# Patient Record
Sex: FEMALE | Race: BLACK OR AFRICAN AMERICAN | NOT HISPANIC OR LATINO | URBAN - METROPOLITAN AREA
[De-identification: names, ages, dates, MRNs, and addresses within clinical notes are randomized per-mention and may not be internally consistent; named-entity substitution may affect disease eponyms.]

---

## 2022-08-26 ENCOUNTER — HOSPITAL ENCOUNTER (EMERGENCY)
Facility: HOSPITAL | Age: 57
Discharge: HOME | End: 2022-08-26
Attending: EMERGENCY MEDICINE
Payer: COMMERCIAL

## 2022-08-26 ENCOUNTER — APPOINTMENT (EMERGENCY)
Dept: CT IMAGING | Facility: HOSPITAL | Age: 57
End: 2022-08-26
Payer: COMMERCIAL

## 2022-08-26 VITALS
HEIGHT: 65 IN | DIASTOLIC BLOOD PRESSURE: 94 MMHG | SYSTOLIC BLOOD PRESSURE: 177 MMHG | HEART RATE: 72 BPM | OXYGEN SATURATION: 100 % | WEIGHT: 160 LBS | RESPIRATION RATE: 16 BRPM | TEMPERATURE: 98.6 F | BODY MASS INDEX: 26.66 KG/M2

## 2022-08-26 DIAGNOSIS — R03.0 ELEVATED BLOOD PRESSURE READING: Primary | ICD-10-CM

## 2022-08-26 LAB
ALBUMIN SERPL BCP-MCNC: 3.9 G/DL (ref 3.5–5)
ALP SERPL-CCNC: 60 U/L (ref 46–116)
ALT SERPL W P-5'-P-CCNC: 26 U/L (ref 12–78)
ANION GAP SERPL CALCULATED.3IONS-SCNC: 9 MMOL/L (ref 4–13)
APTT PPP: 24 SECONDS (ref 23–37)
AST SERPL W P-5'-P-CCNC: 19 U/L (ref 5–45)
ATRIAL RATE: 72 BPM
BASOPHILS # BLD AUTO: 0.04 THOUSANDS/ΜL (ref 0–0.1)
BASOPHILS NFR BLD AUTO: 1 % (ref 0–1)
BILIRUB SERPL-MCNC: 0.31 MG/DL (ref 0.2–1)
BUN SERPL-MCNC: 8 MG/DL (ref 5–25)
CALCIUM SERPL-MCNC: 9.2 MG/DL (ref 8.3–10.1)
CARDIAC TROPONIN I PNL SERPL HS: <2 NG/L
CHLORIDE SERPL-SCNC: 105 MMOL/L (ref 96–108)
CO2 SERPL-SCNC: 28 MMOL/L (ref 21–32)
CREAT SERPL-MCNC: 0.84 MG/DL (ref 0.6–1.3)
EOSINOPHIL # BLD AUTO: 0.09 THOUSAND/ΜL (ref 0–0.61)
EOSINOPHIL NFR BLD AUTO: 1 % (ref 0–6)
ERYTHROCYTE [DISTWIDTH] IN BLOOD BY AUTOMATED COUNT: 13.2 % (ref 11.6–15.1)
GFR SERPL CREATININE-BSD FRML MDRD: 78 ML/MIN/1.73SQ M
GLUCOSE SERPL-MCNC: 106 MG/DL (ref 65–140)
HCT VFR BLD AUTO: 39.7 % (ref 34.8–46.1)
HGB BLD-MCNC: 13.4 G/DL (ref 11.5–15.4)
IMM GRANULOCYTES # BLD AUTO: 0.02 THOUSAND/UL (ref 0–0.2)
IMM GRANULOCYTES NFR BLD AUTO: 0 % (ref 0–2)
INR PPP: 1.07 (ref 0.84–1.19)
LYMPHOCYTES # BLD AUTO: 2.22 THOUSANDS/ΜL (ref 0.6–4.47)
LYMPHOCYTES NFR BLD AUTO: 36 % (ref 14–44)
MCH RBC QN AUTO: 27.7 PG (ref 26.8–34.3)
MCHC RBC AUTO-ENTMCNC: 33.8 G/DL (ref 31.4–37.4)
MCV RBC AUTO: 82 FL (ref 82–98)
MONOCYTES # BLD AUTO: 0.41 THOUSAND/ΜL (ref 0.17–1.22)
MONOCYTES NFR BLD AUTO: 7 % (ref 4–12)
NEUTROPHILS # BLD AUTO: 3.48 THOUSANDS/ΜL (ref 1.85–7.62)
NEUTS SEG NFR BLD AUTO: 55 % (ref 43–75)
NRBC BLD AUTO-RTO: 0 /100 WBCS
P AXIS: 44 DEGREES
PLATELET # BLD AUTO: 277 THOUSANDS/UL (ref 149–390)
PMV BLD AUTO: 9.7 FL (ref 8.9–12.7)
POTASSIUM SERPL-SCNC: 3.2 MMOL/L (ref 3.5–5.3)
PR INTERVAL: 152 MS
PROT SERPL-MCNC: 8.8 G/DL (ref 6.4–8.4)
PROTHROMBIN TIME: 13.7 SECONDS (ref 11.6–14.5)
QRS AXIS: 28 DEGREES
QRSD INTERVAL: 82 MS
QT INTERVAL: 416 MS
QTC INTERVAL: 455 MS
RBC # BLD AUTO: 4.83 MILLION/UL (ref 3.81–5.12)
SODIUM SERPL-SCNC: 142 MMOL/L (ref 135–147)
T WAVE AXIS: 82 DEGREES
VENTRICULAR RATE: 72 BPM
WBC # BLD AUTO: 6.26 THOUSAND/UL (ref 4.31–10.16)

## 2022-08-26 PROCEDURE — 80053 COMPREHEN METABOLIC PANEL: CPT | Performed by: SURGERY

## 2022-08-26 PROCEDURE — 70450 CT HEAD/BRAIN W/O DYE: CPT

## 2022-08-26 PROCEDURE — 99284 EMERGENCY DEPT VISIT MOD MDM: CPT

## 2022-08-26 PROCEDURE — 93005 ELECTROCARDIOGRAM TRACING: CPT

## 2022-08-26 PROCEDURE — 85025 COMPLETE CBC W/AUTO DIFF WBC: CPT | Performed by: SURGERY

## 2022-08-26 PROCEDURE — 99284 EMERGENCY DEPT VISIT MOD MDM: CPT | Performed by: SURGERY

## 2022-08-26 PROCEDURE — 36415 COLL VENOUS BLD VENIPUNCTURE: CPT | Performed by: SURGERY

## 2022-08-26 PROCEDURE — 85610 PROTHROMBIN TIME: CPT | Performed by: SURGERY

## 2022-08-26 PROCEDURE — 93010 ELECTROCARDIOGRAM REPORT: CPT | Performed by: INTERNAL MEDICINE

## 2022-08-26 PROCEDURE — 85730 THROMBOPLASTIN TIME PARTIAL: CPT | Performed by: SURGERY

## 2022-08-26 PROCEDURE — 84484 ASSAY OF TROPONIN QUANT: CPT | Performed by: SURGERY

## 2022-08-26 NOTE — ED PROVIDER NOTES
History  Chief Complaint   Patient presents with    Hypertension     Pt states she took BP at home and was 158/102, pt has been taking friends nifedipine for the past week until she can get seen by her PCP  Pt took nifedipine at 9pm and 11pm  Pt denies HA, chest pain or shortness of breath  Pt admits to being under a lot of stress recently  Mildred Campbell is a 56 y o  female with no pertinent past medical history presenting today with blood pressure reading at home  Patient has associated headache  Patient reports that for the past 1 week she has been taking her friend nifedipine for her elevated blood pressure  Reports elevated blood pressure reading print her a lot of anxiety  Denies any chest pain shortness of breath  Reports that she has been under lot of stress recently  Her blood pressure at home was 158/102  Denies any syncopal episodes, nausea/vomiting/diarrhea, numbness/tingling/weakness in the extremities  Patient has not followed up with her primary care provider regarding elevated blood pressure reading get, however she has a scheduled appointment  No further complaints at this time  None       History reviewed  No pertinent past medical history  History reviewed  No pertinent surgical history  History reviewed  No pertinent family history  I have reviewed and agree with the history as documented  E-Cigarette/Vaping    E-Cigarette Use Never User      E-Cigarette/Vaping Substances     Social History     Tobacco Use    Smoking status: Never Smoker    Smokeless tobacco: Never Used   Vaping Use    Vaping Use: Never used   Substance Use Topics    Alcohol use: Not Currently    Drug use: Not Currently       Review of Systems   Constitutional: Negative for chills and fever  HENT: Negative for ear pain and sore throat  Eyes: Negative for pain and visual disturbance  Respiratory: Negative for cough and shortness of breath      Cardiovascular: Negative for chest pain and palpitations  Gastrointestinal: Negative for abdominal pain and vomiting  Genitourinary: Negative for dysuria and hematuria  Musculoskeletal: Negative for arthralgias and back pain  Skin: Negative for color change and rash  Neurological: Negative for seizures and syncope  All other systems reviewed and are negative  Physical Exam  Physical Exam  Vitals and nursing note reviewed  Constitutional:       General: She is not in acute distress  Appearance: She is well-developed and normal weight  HENT:      Head: Normocephalic and atraumatic  Right Ear: External ear normal       Left Ear: External ear normal       Nose: Nose normal  No congestion or rhinorrhea  Mouth/Throat:      Mouth: Mucous membranes are moist       Pharynx: Oropharynx is clear  Eyes:      Extraocular Movements: Extraocular movements intact  Conjunctiva/sclera: Conjunctivae normal       Pupils: Pupils are equal, round, and reactive to light  Cardiovascular:      Rate and Rhythm: Normal rate and regular rhythm  Heart sounds: No murmur heard  Pulmonary:      Effort: Pulmonary effort is normal  No respiratory distress  Breath sounds: Normal breath sounds  Abdominal:      Palpations: Abdomen is soft  Tenderness: There is no abdominal tenderness  Musculoskeletal:         General: No swelling, tenderness or deformity  Normal range of motion  Cervical back: Normal range of motion and neck supple  No rigidity  Skin:     General: Skin is warm and dry  Capillary Refill: Capillary refill takes less than 2 seconds  Neurological:      General: No focal deficit present  Mental Status: She is alert and oriented to person, place, and time  Mental status is at baseline  Cranial Nerves: No cranial nerve deficit  Motor: No weakness        Gait: Gait normal          Vital Signs  ED Triage Vitals   Temperature Pulse Respirations Blood Pressure SpO2   08/26/22 0151 08/26/22 0148 08/26/22 0148 08/26/22 0148 08/26/22 0148   98 6 °F (37 °C) 78 18 (!) 186/115 100 %      Temp Source Heart Rate Source Patient Position - Orthostatic VS BP Location FiO2 (%)   08/26/22 0151 08/26/22 0148 08/26/22 0148 08/26/22 0148 --   Oral Monitor Sitting Right arm       Pain Score       --                  Vitals:    08/26/22 0148 08/26/22 0410   BP: (!) 186/115 (!) 177/94   Pulse: 78 72   Patient Position - Orthostatic VS: Sitting Sitting         Visual Acuity      ED Medications  Medications - No data to display    Diagnostic Studies  Results Reviewed     Procedure Component Value Units Date/Time    HS Troponin 0hr (reflex protocol) [[id number]]  (Normal) Collected: 08/26/22 0239    Lab Status: Final result Specimen: Blood from Arm, Left Updated: 08/26/22 0311     hs TnI 0hr <2 ng/L     Comprehensive metabolic panel [[id number]]  (Abnormal) Collected: 08/26/22 0239    Lab Status: Final result Specimen: Blood from Arm, Left Updated: 08/26/22 0306     Sodium 142 mmol/L      Potassium 3 2 mmol/L      Chloride 105 mmol/L      CO2 28 mmol/L      ANION GAP 9 mmol/L      BUN 8 mg/dL      Creatinine 0 84 mg/dL      Glucose 106 mg/dL      Calcium 9 2 mg/dL      AST 19 U/L      ALT 26 U/L      Alkaline Phosphatase 60 U/L      Total Protein 8 8 g/dL      Albumin 3 9 g/dL      Total Bilirubin 0 31 mg/dL      eGFR 78 ml/min/1 73sq m     Narrative:      [place] guidelines for Chronic Kidney Disease (CKD):     Stage 1 with normal or high GFR (GFR > 90 mL/min/1 73 square meters)    Stage 2 Mild CKD (GFR = 60-89 mL/min/1 73 square meters)    Stage 3A Moderate CKD (GFR = 45-59 mL/min/1 73 square meters)    Stage 3B Moderate CKD (GFR = 30-44 mL/min/1 73 square meters)    Stage 4 Severe CKD (GFR = 15-29 mL/min/1 73 square meters)    Stage 5 End Stage CKD (GFR <15 mL/min/1 73 square meters)  Note: GFR calculation is accurate only with a steady state creatinine    Protime-INR [[id number]]  (Normal) Collected: 08/26/22 0239    Lab Status: Final result Specimen: Blood from Arm, Left Updated: 08/26/22 0258     Protime 13 7 seconds      INR 1 07    APTT [[id number]]  (Normal) Collected: 08/26/22 0239    Lab Status: Final result Specimen: Blood from Arm, Left Updated: 08/26/22 0258     PTT 24 seconds     CBC and differential [[id number]] Collected: 08/26/22 0239    Lab Status: Final result Specimen: Blood from Arm, Left Updated: 08/26/22 0243     WBC 6 26 Thousand/uL      RBC 4 83 Million/uL      Hemoglobin 13 4 g/dL      Hematocrit 39 7 %      MCV 82 fL      MCH 27 7 pg      MCHC 33 8 g/dL      RDW 13 2 %      MPV 9 7 fL      Platelets 186 Thousands/uL      nRBC 0 /100 WBCs      Neutrophils Relative 55 %      Immat GRANS % 0 %      Lymphocytes Relative 36 %      Monocytes Relative 7 %      Eosinophils Relative 1 %      Basophils Relative 1 %      Neutrophils Absolute 3 48 Thousands/µL      Immature Grans Absolute 0 02 Thousand/uL      Lymphocytes Absolute 2 22 Thousands/µL      Monocytes Absolute 0 41 Thousand/µL      Eosinophils Absolute 0 09 Thousand/µL      Basophils Absolute 0 04 Thousands/µL                  CT head without contrast   Final Result by Sis Damico MD (08/26 0308)      No acute intracranial abnormality  Workstation performed: [id number]                    Procedures  Procedures         ED Course             HEART Risk Score    Flowsheet Row Most Recent Value   Heart Score Risk Calculator    History 1 Filed at: 08/26/2022 0336   ECG 1 Filed at: 08/26/2022 0336   Age 1 Filed at: 08/26/2022 0336   Risk Factors 1 Filed at: 08/26/2022 0336   Troponin 0 Filed at: 08/26/2022 0336   HEART Score 4 Filed at: 08/26/2022 5199                                      MDM  Number of Diagnoses or Management Options  Elevated blood pressure reading: minor  Diagnosis management comments: Patient lab work unremarkable  Hypertensive urgency without image  Discussed patient bedside    Her blood pressure came down spontaneously resting here in the emergency  Headache resolved  CT imaging was negative  I discussed all the findings with her at bedside  Strict return criteria were discussed with her bedside  I recommended very close follow-up with her primary care provider  She demonstrated understanding  Amount and/or Complexity of Data Reviewed  Clinical lab tests: ordered and reviewed  Tests in the radiology section of CPT®: ordered and reviewed  Tests in the medicine section of CPT®: ordered and reviewed  Review and summarize past medical records: yes  Discuss the patient with other providers: yes  Independent visualization of images, tracings, or specimens: yes    Risk of Complications, Morbidity, and/or Mortality  Presenting problems: moderate  Diagnostic procedures: moderate  Management options: moderate    Patient Progress  Patient progress: stable      Disposition  Final diagnoses:   Elevated blood pressure reading     Time reflects when diagnosis was documented in both MDM as applicable and the Disposition within this note     Time User Action Codes Description Comment    8/26/2022  3:36 AM Lexie León Add [R03 0] Elevated blood pressure reading       ED Disposition     ED Disposition   Discharge    Condition   Stable    Date/Time   Fri Aug 26, 2022  3:36 AM    Comment   Citlalli Blackwood discharge to home/self care  Follow-up Information     Follow up With Specialties Details Why Contact Info Additional [address] [facility] Emergency Department Emergency Medicine Go to  If symptoms worsen [address] [facility] [address] [facility] Emergency Department, [address], [place], 1000 [facility] Cardiology Call today To schedule an appointment for follow-up within the next 1-2 weeks for reevaluation   [address] [facility] [address]  [place] [address] Cardiology [address] [facility], [address],  [address]  Call today To schedule an appointment for follow-up with a primary care provider within the next 1 week  [phone]             There are no discharge medications for this patient  No discharge procedures on file      PDMP Review     None          ED Provider  Electronically Signed by           Cinthia Braxton PA-C  08/26/22 9306

## 2022-09-20 ENCOUNTER — TELEPHONE (OUTPATIENT)
Dept: CARDIOLOGY CLINIC | Facility: CLINIC | Age: 57
End: 2022-09-20

## 2022-09-20 NOTE — TELEPHONE ENCOUNTER
SPOKE TO PT'S INSUR CO & WE ARE IN PAR W/ PT'S INSUR & NO REFERRAL IS NEEDED  REF # OF THE CALL IS: [id number]
None

## 2022-09-22 ENCOUNTER — OFFICE VISIT (OUTPATIENT)
Dept: FAMILY MEDICINE CLINIC | Facility: CLINIC | Age: 57
End: 2022-09-22
Payer: COMMERCIAL

## 2022-09-22 VITALS
TEMPERATURE: 96.4 F | HEIGHT: 65 IN | BODY MASS INDEX: 27.32 KG/M2 | WEIGHT: 164 LBS | OXYGEN SATURATION: 100 % | DIASTOLIC BLOOD PRESSURE: 122 MMHG | SYSTOLIC BLOOD PRESSURE: 182 MMHG | HEART RATE: 78 BPM

## 2022-09-22 DIAGNOSIS — R03.0 ELEVATED BP WITHOUT DIAGNOSIS OF HYPERTENSION: ICD-10-CM

## 2022-09-22 DIAGNOSIS — Z13.220 SCREENING, LIPID: ICD-10-CM

## 2022-09-22 DIAGNOSIS — Z00.00 HEALTHCARE MAINTENANCE: Primary | ICD-10-CM

## 2022-09-22 PROBLEM — Z78.9 VEGETARIAN DIET: Status: ACTIVE | Noted: 2022-09-22

## 2022-09-22 PROCEDURE — 99386 PREV VISIT NEW AGE 40-64: CPT | Performed by: FAMILY MEDICINE

## 2022-09-22 RX ORDER — NIFEDIPINE 30 MG/1
30 TABLET, FILM COATED, EXTENDED RELEASE ORAL DAILY
COMMUNITY
End: 2022-09-23

## 2022-09-22 NOTE — PROGRESS NOTES
Mildred Campbell 1965 female MRN: [id number]      ASSESSMENT/PLAN  Problem List Items Addressed This Visit    None     Visit Diagnoses     Healthcare maintenance    -  Primary    Elevated BP without diagnosis of hypertension        Screening, lipid        Relevant Orders    Lipid panel        BP elevated -- pt was previously taking Nifedipine, encouraged to re-start and f/u with Cardio as scheduled tomorrow   Pt defers therapy referral/medication for stress/anxiety -- is writing, exercising to help with her symptom management     Lipids to screen for HLD  HIV, Hep C screening deferred per pt request   Pap deferred  Mammogram deferred  CRC deferred  Vaccinations: TDap, Flu, Shingles, COVID all deferred  Encouraged regular physical activity, varied diet, and regular dental/eye exams       Future Appointments   Date Time Provider Sha Jayla   9/23/2022  4:00 PM Leobardo Barclay MD [facility]          SUBJECTIVE  CC: Establish Care and Blood Pressure Check      HPI:  Mildred Campbell is a 56 y o  female who presents to establish care  History reviewed and updated as below  ERIK Goodwin on 8/26 due to high blood pressure   CT Head benign   Labs benign, aside from K slightly low   EKG: NSR, non-specific ST changes     Pt was previously on Nifedipine, stopped due to hypotension   Scheduled with Cardiology tomorrow     Pt notes she is under a great deal of stress -- she lost her brother last month and she is going through a divorce     Review of Systems   Constitutional: Negative for unexpected weight change  HENT: Positive for sinus pressure (intermittent)  Negative for congestion, ear pain, rhinorrhea and sore throat  Eyes: Positive for visual disturbance ("my vision is going" -- wears glasses)  Respiratory: Positive for chest tightness  Negative for cough and shortness of breath  Cardiovascular: Negative for chest pain, palpitations and leg swelling     Gastrointestinal: Negative for abdominal pain, constipation and diarrhea  Endocrine: Negative for polyuria  Genitourinary: Negative for dysuria and vaginal bleeding  Neurological: Negative for dizziness and headaches  Psychiatric/Behavioral: Positive for sleep disturbance  The patient is nervous/anxious  Historical Information   The patient history was reviewed and updated as follows:    History reviewed  No pertinent past medical history  Past Surgical History:   Procedure Laterality Date    NO PAST SURGERIES       Family History   Problem Relation Age of Onset    Diabetes Family     Hypertension Family       Social History   Social History     Substance and Sexual Activity   Alcohol Use Yes    Comment: Occasional     Social History     Substance and Sexual Activity   Drug Use Not Currently     Social History     Tobacco Use   Smoking Status Never Smoker   Smokeless Tobacco Never Used       Medications:     Current Outpatient Medications:     NIFEdipine ER (ADALAT CC) 30 MG 24 hr tablet, Take 30 mg by mouth daily, Disp: , Rfl:   No Known Allergies    OBJECTIVE    Vitals:   Vitals:    09/22/22 1102 09/22/22 1116   BP: (!) 196/128 (!) 182/122   Pulse: 78    Temp: (!) 96 4 °F (35 8 °C)    SpO2: 100%    Weight: 74 4 kg (164 lb)    Height: 5' 5" (1 651 m)            Physical Exam  Vitals and nursing note reviewed  Constitutional:       General: She is not in acute distress  Appearance: Normal appearance  HENT:      Head: Normocephalic and atraumatic  Right Ear: Tympanic membrane, ear canal and external ear normal       Left Ear: Tympanic membrane, ear canal and external ear normal       Nose: Nose normal       Mouth/Throat:      Mouth: Mucous membranes are moist       Pharynx: No oropharyngeal exudate or posterior oropharyngeal erythema  Eyes:      Conjunctiva/sclera: Conjunctivae normal    Cardiovascular:      Rate and Rhythm: Normal rate and regular rhythm     Pulmonary:      Effort: Pulmonary effort is normal  No respiratory distress  Breath sounds: Normal breath sounds  Abdominal:      General: Bowel sounds are normal  There is no distension  Palpations: Abdomen is soft  Tenderness: There is no abdominal tenderness  Musculoskeletal:      Right lower leg: No edema  Left lower leg: No edema  Lymphadenopathy:      Cervical: No cervical adenopathy  Skin:     General: Skin is warm and dry  Neurological:      General: No focal deficit present  Mental Status: She is alert     Psychiatric:         Mood and Affect: Mood normal                     DO Norah Sales [address] [facility]   9/22/2022  11:27 AM

## 2022-09-23 ENCOUNTER — CONSULT (OUTPATIENT)
Dept: CARDIOLOGY CLINIC | Facility: CLINIC | Age: 57
End: 2022-09-23
Payer: COMMERCIAL

## 2022-09-23 VITALS
HEART RATE: 76 BPM | OXYGEN SATURATION: 99 % | WEIGHT: 162 LBS | SYSTOLIC BLOOD PRESSURE: 140 MMHG | BODY MASS INDEX: 26.99 KG/M2 | RESPIRATION RATE: 16 BRPM | HEIGHT: 65 IN | DIASTOLIC BLOOD PRESSURE: 98 MMHG

## 2022-09-23 DIAGNOSIS — I10 PRIMARY HYPERTENSION: Primary | ICD-10-CM

## 2022-09-23 LAB
CHOLEST SERPL-MCNC: 199 MG/DL
CHOLEST/HDLC SERPL: 3.4 (CALC)
HDLC SERPL-MCNC: 59 MG/DL
LDLC SERPL CALC-MCNC: 121 MG/DL (CALC)
NONHDLC SERPL-MCNC: 140 MG/DL (CALC)
TRIGL SERPL-MCNC: 88 MG/DL

## 2022-09-23 PROCEDURE — 99204 OFFICE O/P NEW MOD 45 MIN: CPT | Performed by: INTERNAL MEDICINE

## 2022-09-23 RX ORDER — SPIRONOLACTONE 25 MG/1
25 TABLET ORAL DAILY
Qty: 30 TABLET | Refills: 5 | Status: SHIPPED | OUTPATIENT
Start: 2022-09-23

## 2022-09-23 RX ORDER — AMLODIPINE BESYLATE 5 MG/1
5 TABLET ORAL DAILY
Qty: 30 TABLET | Refills: 5 | Status: SHIPPED | OUTPATIENT
Start: 2022-09-23

## 2022-09-23 NOTE — PROGRESS NOTES
Cardiology Consultation     Sarah Alanis  [id number]  1965  [facility] [address] [place] [address]    HPI:  Very pleasant mother and grandmother who has no history in the past of hypertension but recently a nurse practitioner took her blood pressure because she had a headache  Her blood pressure was quite high and she was sent to the hospital but she left because it was taking too long  More recently she saw her primary doctor and her blood pressure again was significantly elevated  She had been taking Adalat which she got from a friend  She was advised to start taking it again and her blood pressure has improved  Curiously, her potassium level was 3 2 despite the fact that she was not taking a diuretic  There is a family history of hypertension  Patient does not smoke and does not have diabetes  1  Primary hypertension  -     spironolactone (ALDACTONE) 25 mg tablet; Take 1 tablet (25 mg total) by mouth daily  -     amLODIPine (NORVASC) 5 mg tablet; Take 1 tablet (5 mg total) by mouth daily  -     Basic metabolic panel; Future; Expected date: 10/11/2022  -     Basic metabolic panel      Patient Active Problem List   Diagnosis    Vegetarian diet     History reviewed  No pertinent past medical history    Social History     Socioeconomic History    Marital status: Unknown     Spouse name: Not on file    Number of children: Not on file    Years of education: Not on file    Highest education level: Not on file   Occupational History    Not on file   Tobacco Use    Smoking status: Never Smoker    Smokeless tobacco: Never Used   Vaping Use    Vaping Use: Never used   Substance and Sexual Activity    Alcohol use: Yes     Comment: Occasional    Drug use: Not Currently    Sexual activity: Not on file   Other Topics Concern    Not on file   Social History Narrative    Lives with son, daughter      Social Determinants of Health     Financial Resource Strain: Not on file   Food Insecurity: Not on file   Transportation Needs: Not on file   Physical Activity: Not on file   Stress: Not on file   Social Connections: Not on file   Intimate Partner Violence: Not on file   Housing Stability: Not on file      Family History   Problem Relation Age of Onset    Diabetes Family     Hypertension Family      Past Surgical History:   Procedure Laterality Date    NO PAST SURGERIES         Current Outpatient Medications:     amLODIPine (NORVASC) 5 mg tablet, Take 1 tablet (5 mg total) by mouth daily, Disp: 30 tablet, Rfl: 5    spironolactone (ALDACTONE) 25 mg tablet, Take 1 tablet (25 mg total) by mouth daily, Disp: 30 tablet, Rfl: 5  No Known Allergies  Vitals:    09/23/22 1624   BP: 140/98   BP Location: Left arm   Patient Position: Sitting   Cuff Size: Standard   Pulse: 76   Resp: 16   SpO2: 99%   Weight: 73 5 kg (162 lb)   Height: 5' 5" (1 651 m)       Labs:  Office Visit on 09/22/2022   Component Date Value    Total Cholesterol 09/22/2022 199     HDL 09/22/2022 59     Triglycerides 09/22/2022 88     LDL Calculated 09/22/2022 121 (A)    Chol HDLC Ratio 09/22/2022 3 4     Non-HDL Cholesterol 09/22/2022 140 (A)   Admission on 08/26/2022, Discharged on 08/26/2022   Component Date Value    WBC 08/26/2022 6 26     RBC 08/26/2022 4 83     Hemoglobin 08/26/2022 13 4     Hematocrit 08/26/2022 39 7     MCV 08/26/2022 82     MCH 08/26/2022 27 7     MCHC 08/26/2022 33 8     RDW 08/26/2022 13 2     MPV 08/26/2022 9 7     Platelets 06/18/2775 277     nRBC 08/26/2022 0     Neutrophils Relative 08/26/2022 55     Immat GRANS % 08/26/2022 0     Lymphocytes Relative 08/26/2022 36     Monocytes Relative 08/26/2022 7     Eosinophils Relative 08/26/2022 1     Basophils Relative 08/26/2022 1     Neutrophils Absolute 08/26/2022 3 48     Immature Grans Absolute 08/26/2022 0 02     Lymphocytes Absolute 08/26/2022 2 22     Monocytes Absolute 08/26/2022 0 41     Eosinophils Absolute 08/26/2022 0 09     Basophils Absolute 08/26/2022 0 04     Protime 08/26/2022 13 7     INR 08/26/2022 1 07     PTT 08/26/2022 24     Sodium 08/26/2022 142     Potassium 08/26/2022 3 2 (A)    Chloride 08/26/2022 105     CO2 08/26/2022 28     ANION GAP 08/26/2022 9     BUN 08/26/2022 8     Creatinine 08/26/2022 0 84     Glucose 08/26/2022 106     Calcium 08/26/2022 9 2     AST 08/26/2022 19     ALT 08/26/2022 26     Alkaline Phosphatase 08/26/2022 60     Total Protein 08/26/2022 8 8 (A)    Albumin 08/26/2022 3 9     Total Bilirubin 08/26/2022 0 31     eGFR 08/26/2022 78     hs TnI 0hr 08/26/2022 <2     Ventricular Rate 08/26/2022 72     Atrial Rate 08/26/2022 72     OR Interval 08/26/2022 152     QRSD Interval 08/26/2022 82     QT Interval 08/26/2022 416     QTC Interval 08/26/2022 455     P Axis 08/26/2022 44     QRS Axis 08/26/2022 28     T Wave Axis 08/26/2022 82      Imaging: CT head without contrast    Result Date: 8/26/2022  Narrative: CT BRAIN - WITHOUT CONTRAST INDICATION:   pressure - elevated BP  [employer] [employer] COMPARISON:  None  TECHNIQUE:  CT examination of the brain was performed  In addition to axial images, sagittal and coronal 2D reformatted images were created and submitted for interpretation  Radiation dose length product (DLP) for this visit:  754 mGy-cm   This examination, like all CT scans performed in the [facility], was performed utilizing techniques to minimize radiation dose exposure, including the use of iterative reconstruction and automated exposure control  IMAGE QUALITY:  Diagnostic  FINDINGS: PARENCHYMA:  No intracranial mass, mass effect or midline shift  No CT signs of acute infarction  No acute parenchymal hemorrhage  VENTRICLES AND EXTRA-AXIAL SPACES:  Normal for the patient's age  VISUALIZED ORBITS AND PARANASAL SINUSES:  Unremarkable   CALVARIUM AND EXTRACRANIAL SOFT TISSUES:  Normal      Impression: No acute intracranial abnormality  Workstation performed: [id number]       Review of Systems:  Review of Systems    Physical Exam:  140/98  Well-developed well-nourished  Pupils equal   Carotids 2+ without bruits  Lungs clear  Heart rate 75, regular  No murmurs or gallops  No organomegaly  Good femoral and peripheral pulses  No edema  No calf tenderness  Recent EKG showed nonspecific ST-T changes  Discussion/Summary:    1  Hypertension most likely essential  2  Hypokalemia      Recommendations:    1  Will start Aldactone which should help with potassium consultation  (25 mg)  2  Norvasc 5 mg daily  3  I informed her that long-term outcomes are improved with a 2 medication plan  4  Patient advised to keep a record of her blood pressure after sitting for 5 minutes on a daily basis and record the date and the blood pressure and bring it with her next time  5  Recheck potassium in 3 weeks and follow-up visit in 4 weeks                Serena Wiley MD

## 2022-11-02 ENCOUNTER — OFFICE VISIT (OUTPATIENT)
Dept: CARDIOLOGY CLINIC | Facility: CLINIC | Age: 57
End: 2022-11-02

## 2022-11-02 VITALS
BODY MASS INDEX: 26.33 KG/M2 | HEIGHT: 65 IN | HEART RATE: 68 BPM | SYSTOLIC BLOOD PRESSURE: 136 MMHG | WEIGHT: 158 LBS | RESPIRATION RATE: 16 BRPM | DIASTOLIC BLOOD PRESSURE: 92 MMHG | OXYGEN SATURATION: 99 %

## 2022-11-02 DIAGNOSIS — I10 PRIMARY HYPERTENSION: Primary | ICD-10-CM

## 2022-11-02 NOTE — PROGRESS NOTES
Cardiology Follow Up    Eric Valencia  1965  [id number]  [address] [facility] [address] [place] [id number] [phone]    1  Primary hypertension  -     Basic metabolic panel          Interval History:  Patient with primary hypertension who also had hypokalemia and I started her on Aldactone in addition to her Norvasc  On this regimen her blood pressures have been averaging around 014 systolic and 84 diastolic  Patient Active Problem List   Diagnosis   • Vegetarian diet   • Primary hypertension     History reviewed  No pertinent past medical history    Social History     Socioeconomic History   • Marital status: Unknown     Spouse name: Not on file   • Number of children: Not on file   • Years of education: Not on file   • Highest education level: Not on file   Occupational History   • Not on file   Tobacco Use   • Smoking status: Never Smoker   • Smokeless tobacco: Never Used   Vaping Use   • Vaping Use: Never used   Substance and Sexual Activity   • Alcohol use: Yes     Comment: Occasional   • Drug use: Not Currently   • Sexual activity: Not on file   Other Topics Concern   • Not on file   Social History Narrative    Lives with son, daughter      Social Determinants of Health     Financial Resource Strain: Not on file   Food Insecurity: Not on file   Transportation Needs: Not on file   Physical Activity: Not on file   Stress: Not on file   Social Connections: Not on file   Intimate Partner Violence: Not on file   Housing Stability: Not on file      Family History   Problem Relation Age of Onset   • Diabetes Family    • Hypertension Family      Past Surgical History:   Procedure Laterality Date   • NO PAST SURGERIES         Current Outpatient Medications:   •  amLODIPine (NORVASC) 5 mg tablet, Take 1 tablet (5 mg total) by mouth daily, Disp: 30 tablet, Rfl: 5  •  spironolactone (ALDACTONE) 25 mg tablet, Take 1 tablet (25 mg total) by mouth daily (Patient not taking: No sig reported), Disp: 30 tablet, Rfl: 5  No Known Allergies    Labs:  Office Visit on 09/22/2022   Component Date Value   • Total Cholesterol 09/22/2022 199    • HDL 09/22/2022 59    • Triglycerides 09/22/2022 88    • LDL Calculated 09/22/2022 121 (A)   • Chol HDLC Ratio 09/22/2022 3 4    • Non-HDL Cholesterol 09/22/2022 140 (A)     Imaging: No results found  Review of Systems:  Review of Systems    Physical Exam:  136/92  Lungs clear  Rhythm regular  No murmurs  No edema  Discussion/Summary:    1  Primary hypertension coming under control  2  History of hypokalemia    Recommendations:    1  Recheck potassium level  2  Follow-up with primary care doctor and or luc Goode MD

## 2022-11-03 LAB
BUN SERPL-MCNC: 8 MG/DL (ref 7–25)
BUN/CREAT SERPL: NORMAL (CALC) (ref 6–22)
CALCIUM SERPL-MCNC: 9.8 MG/DL (ref 8.6–10.4)
CHLORIDE SERPL-SCNC: 105 MMOL/L (ref 98–110)
CO2 SERPL-SCNC: 32 MMOL/L (ref 20–32)
CREAT SERPL-MCNC: 0.73 MG/DL (ref 0.5–1.03)
GFR/BSA.PRED SERPLBLD CYS-BASED-ARV: 97 ML/MIN/1.73M2
GLUCOSE SERPL-MCNC: 96 MG/DL (ref 65–99)
POTASSIUM SERPL-SCNC: 3.7 MMOL/L (ref 3.5–5.3)
SODIUM SERPL-SCNC: 142 MMOL/L (ref 135–146)